# Patient Record
Sex: FEMALE | Race: WHITE | NOT HISPANIC OR LATINO | Employment: OTHER | URBAN - METROPOLITAN AREA
[De-identification: names, ages, dates, MRNs, and addresses within clinical notes are randomized per-mention and may not be internally consistent; named-entity substitution may affect disease eponyms.]

---

## 2022-08-04 NOTE — PATIENT DISCUSSION
Straightening lashes. Given sample of Zylet BID until it runs out. RTC to epilate if no improvement.

## 2023-01-24 ENCOUNTER — NEW PATIENT (OUTPATIENT)
Dept: URBAN - METROPOLITAN AREA CLINIC 44 | Facility: CLINIC | Age: 68
End: 2023-01-24

## 2023-01-24 DIAGNOSIS — G51.0: ICD-10-CM

## 2023-01-24 DIAGNOSIS — H57.812: ICD-10-CM

## 2023-01-24 PROCEDURE — 99203 OFFICE O/P NEW LOW 30 MIN: CPT

## 2023-01-24 PROCEDURE — 92285 EXTERNAL OCULAR PHOTOGRAPHY: CPT

## 2023-01-24 ASSESSMENT — VISUAL ACUITY
OS_CC: 20/20
OD_CC: 20/20

## 2023-01-30 ENCOUNTER — NEW PATIENT (OUTPATIENT)
Dept: URBAN - METROPOLITAN AREA CLINIC 47 | Facility: CLINIC | Age: 68
End: 2023-01-30

## 2023-01-30 DIAGNOSIS — H52.03: ICD-10-CM

## 2023-01-30 DIAGNOSIS — H52.203: ICD-10-CM

## 2023-01-30 DIAGNOSIS — H52.4: ICD-10-CM

## 2023-01-30 PROCEDURE — 92004 COMPRE OPH EXAM NEW PT 1/>: CPT

## 2023-01-30 PROCEDURE — 92015 DETERMINE REFRACTIVE STATE: CPT

## 2023-01-30 ASSESSMENT — VISUAL ACUITY
OS_CC: 20/30
OS_SC: 20/80
OD_CC: 20/30
OD_CC: J1
OS_CC: J3
OD_SC: 20/100+2

## 2023-01-30 ASSESSMENT — TONOMETRY
OD_IOP_MMHG: 17
OS_IOP_MMHG: 14

## 2023-04-04 ENCOUNTER — PRE-OP/H&P (OUTPATIENT)
Dept: URBAN - METROPOLITAN AREA CLINIC 44 | Facility: CLINIC | Age: 68
End: 2023-04-04

## 2023-04-04 DIAGNOSIS — G51.0: ICD-10-CM

## 2023-04-04 DIAGNOSIS — H57.812: ICD-10-CM

## 2023-04-04 PROCEDURE — 99211HP H&P OFFICE/OUTPATIENT VISIT, EST

## 2023-04-04 RX ORDER — TRAMADOL HCL 50 MG/1: TABLET ORAL

## 2023-04-04 RX ORDER — ERYTHROMYCIN 5 MG/G: 1 OINTMENT OPHTHALMIC

## 2023-05-02 ENCOUNTER — PRE-OP/H&P (OUTPATIENT)
Dept: URBAN - METROPOLITAN AREA SURGERY 14 | Facility: SURGERY | Age: 68
End: 2023-05-02

## 2023-05-02 ENCOUNTER — SURGERY/PROCEDURE (OUTPATIENT)
Dept: URBAN - METROPOLITAN AREA SURGERY 14 | Facility: SURGERY | Age: 68
End: 2023-05-02

## 2023-05-02 DIAGNOSIS — G51.0: ICD-10-CM

## 2023-05-02 DIAGNOSIS — H57.812: ICD-10-CM

## 2023-05-02 PROCEDURE — 67900 REPAIR BROW DEFECT: CPT

## 2023-05-02 PROCEDURE — 99211T TECH SERVICE

## 2023-05-09 ENCOUNTER — POST-OP (OUTPATIENT)
Dept: URBAN - METROPOLITAN AREA CLINIC 44 | Facility: CLINIC | Age: 68
End: 2023-05-09

## 2023-05-09 DIAGNOSIS — Z98.890: ICD-10-CM

## 2023-05-09 DIAGNOSIS — H52.4: ICD-10-CM

## 2023-05-09 DIAGNOSIS — H52.03: ICD-10-CM

## 2023-05-09 DIAGNOSIS — H52.203: ICD-10-CM

## 2023-05-09 DIAGNOSIS — G51.0: ICD-10-CM

## 2023-05-09 DIAGNOSIS — H57.812: ICD-10-CM

## 2023-05-09 PROCEDURE — 92285 EXTERNAL OCULAR PHOTOGRAPHY: CPT

## 2023-05-09 PROCEDURE — 99024 POSTOP FOLLOW-UP VISIT: CPT

## 2023-05-09 ASSESSMENT — VISUAL ACUITY
OS_CC: 20/20-1
OD_CC: 20/20

## 2024-02-01 ENCOUNTER — COMPREHENSIVE EXAM (OUTPATIENT)
Dept: URBAN - METROPOLITAN AREA CLINIC 43 | Facility: CLINIC | Age: 69
End: 2024-02-01